# Patient Record
Sex: FEMALE | URBAN - METROPOLITAN AREA
[De-identification: names, ages, dates, MRNs, and addresses within clinical notes are randomized per-mention and may not be internally consistent; named-entity substitution may affect disease eponyms.]

---

## 2019-10-21 ENCOUNTER — APPOINTMENT (OUTPATIENT)
Dept: URBAN - METROPOLITAN AREA CLINIC 237 | Age: 39
Setting detail: DERMATOLOGY
End: 2019-10-21

## 2019-10-21 DIAGNOSIS — L63.8 OTHER ALOPECIA AREATA: ICD-10-CM

## 2019-10-21 DIAGNOSIS — Q828 OTHER SPECIFIED ANOMALIES OF SKIN: ICD-10-CM

## 2019-10-21 DIAGNOSIS — Q826 OTHER SPECIFIED ANOMALIES OF SKIN: ICD-10-CM

## 2019-10-21 DIAGNOSIS — Q819 OTHER SPECIFIED ANOMALIES OF SKIN: ICD-10-CM

## 2019-10-21 DIAGNOSIS — L91.8 OTHER HYPERTROPHIC DISORDERS OF THE SKIN: ICD-10-CM

## 2019-10-21 PROBLEM — Q82.8 OTHER SPECIFIED CONGENITAL MALFORMATIONS OF SKIN: Status: ACTIVE | Noted: 2019-10-21

## 2019-10-21 PROCEDURE — OTHER PATIENT SPECIFIC COUNSELING: OTHER

## 2019-10-21 PROCEDURE — OTHER MIPS QUALITY: OTHER

## 2019-10-21 PROCEDURE — 99202 OFFICE O/P NEW SF 15 MIN: CPT

## 2019-10-21 PROCEDURE — OTHER COUNSELING: OTHER

## 2019-10-21 PROCEDURE — OTHER TREATMENT REGIMEN: OTHER

## 2019-10-21 PROCEDURE — OTHER PRESCRIPTION: OTHER

## 2019-10-21 PROCEDURE — OTHER DEFER: OTHER

## 2019-10-21 RX ORDER — CLOBETASOL PROPIONATE 0.46 MG/ML
SM AMT SOLUTION TOPICAL QD
Qty: 1 | Refills: 0 | Status: ERX | COMMUNITY
Start: 2019-10-21

## 2019-10-21 ASSESSMENT — LOCATION ZONE DERM
LOCATION ZONE: NECK
LOCATION ZONE: SCALP
LOCATION ZONE: SCALP
LOCATION ZONE: TRUNK

## 2019-10-21 ASSESSMENT — LOCATION DETAILED DESCRIPTION DERM
LOCATION DETAILED: MID-FRONTAL SCALP
LOCATION DETAILED: LEFT INFERIOR LATERAL NECK
LOCATION DETAILED: LEFT INFERIOR ANTERIOR NECK
LOCATION DETAILED: RIGHT INFERIOR LATERAL NECK
LOCATION DETAILED: PERIUMBILICAL SKIN
LOCATION DETAILED: POSTERIOR MID-PARIETAL SCALP
LOCATION DETAILED: RIGHT SUPERIOR PARIETAL SCALP

## 2019-10-21 ASSESSMENT — LOCATION SIMPLE DESCRIPTION DERM
LOCATION SIMPLE: ANTERIOR SCALP
LOCATION SIMPLE: POSTERIOR SCALP
LOCATION SIMPLE: SCALP
LOCATION SIMPLE: RIGHT ANTERIOR NECK
LOCATION SIMPLE: ABDOMEN
LOCATION SIMPLE: LEFT ANTERIOR NECK

## 2019-10-21 NOTE — PROCEDURE: TREATMENT REGIMEN
Detail Level: Zone
Initiate Treatment: Clob 0.05% scalp Sln qd-bid
Initiate Treatment: (S) AmLactin qhs

## 2019-10-21 NOTE — PROCEDURE: PATIENT SPECIFIC COUNSELING
Suggested trying AmLactin first.
Detail Level: Zone
Discussed Dx w/ pt. Reviewed tx options: ILS and topical steroids. Since pt is actively trying to become pregnant now, MD recommends just tx’ing topically. Given erythema, I f AA was not responding to tx, we would consider bx’ing; however, pt states her last derm did do a bx and she’ll try to obtain the results herself. Discontinue topical steroid if becomes pregnant.